# Patient Record
Sex: MALE | Race: WHITE
[De-identification: names, ages, dates, MRNs, and addresses within clinical notes are randomized per-mention and may not be internally consistent; named-entity substitution may affect disease eponyms.]

---

## 2019-06-12 ENCOUNTER — HOSPITAL ENCOUNTER (OUTPATIENT)
Dept: HOSPITAL 84 - D.OPS | Age: 39
Discharge: HOME | End: 2019-06-12
Attending: SURGERY
Payer: COMMERCIAL

## 2019-06-12 VITALS — HEIGHT: 75 IN | WEIGHT: 188 LBS | BODY MASS INDEX: 23.38 KG/M2 | BODY MASS INDEX: 23.38 KG/M2

## 2019-06-12 VITALS — SYSTOLIC BLOOD PRESSURE: 135 MMHG | DIASTOLIC BLOOD PRESSURE: 75 MMHG

## 2019-06-12 DIAGNOSIS — L98.8: Primary | ICD-10-CM

## 2019-06-12 NOTE — NUR
REC'D FROM [facility] ACCOMPANIED BY [facility] OFFICER, DRESSING WITH SMALL AMT OF
BLOODY DRAINAGE TO RIGHT LOWER SIDE. ORANGE JUICE AND FL DIET SERVED.

## 2019-06-12 NOTE — NUR
IV DC'D WITH CATHETER INTACT. WRITTEN AND VERBAL DC INST. GIVEN TO
[facility] OFFICERS ALONG WITH RX. VERBALIZED UNDERSTANDING.

## 2019-06-12 NOTE — OP
PATIENT NAME:  YUDITH ADAMSON                           MEDICAL RECORD: [id number]
:[birth year]                                             LOCATION:[facility]          
                                                         ADMISSION DATE:        
SURGEON:  RICK VAZQUEZ MD            
 
 
DATE OF OPERATION:  2019
 
PREOPERATIVE DIAGNOSIS:  Posttraumatic right chest wall mass.
 
POSTOPERATIVE DIAGNOSES:  Posttraumatic right chest wall mass with small fibrous
tract.
 
PROCEDURE:  Excisional debridement of right chest wall mass.  The dimensions of
debridement, including margins, measured 6.2 in the medial lateral dimension as
well as 4.0 cm in cephalad caudad dimension.  The tissues debrided were the skin
and subcutaneous tissue as well as the fibrous tract.  There was an intermediate
closure.
 
SURGEON:  Rick Vazquez MD
 
ASSISTANT:  None.
 
BLOOD LOSS:  25 cc.
 
ANESTHESIA:  General.
 
COMPLICATIONS:  None.
 
The risks, possible complications and alternatives to the procedure were
explained to the patient.  He elects to proceed.  The discussion specifically
included, but was not limited to, bleeding requiring an emergency reoperation,
infection, possible need for additional procedure or procedures.
 
The patient states that he has had 3 procedures already on this mass.  I have
personally reviewed the CT images prior to the operation.
 
OPERATIVE COURSE:  The patient was conveyed to the operating room electively on
2019.  General anesthesia was induced by the anesthesia staff.  The right
chest was sterilely prepped and draped.  Through the use of double curvilinear
incisions, I excised the skin and subcutaneous tissue overlying the mass.  I
then excised the fibrous tract in the center of this mass.  I approached the
right costal margin.
 
It appears that this mass is actually likely deformity due to fractured bone. 
It is very likely that this deformity will heal with time; however, when the
bones heal, he likely will have the same protuberant deformity for the rest of
his life.  It would be unwise to try to fixate the bone at this site.  It does
not appear to be causing any functional problem.
 
Subcutaneous flaps were created sharply.  The subdermis was approximated with
interrupted 3-0 Vicryls.  The skin was approximated with a running
intracuticular 3-0 Vicryl.  Benzoin and Steri-Strips were applied.
 
The patient was then extubated and conveyed to the post-anesthesia care unit
where he was in stable condition.  He is going to be [facility] back to the
[facility] with [facility]codone for pain.  I think it would be reasonable to renew his
gabapentin as he does have some neurogenic pain.  There is no need for him to
 
 
 
OPERATIVE REPORT                               [id number]    YUDITH ADAMSON         
 
 
follow up with me in the office unless he develops complication related to this
operative procedure.  I could see him on rounds out of [facility] if necessary.
 
TRANSINT:[id number] Voice Confirmation ID: [id number] DOCUMENT ID: [id number]
                                           
                                           RICK VAZQUEZ MD            
 
 
 
Electronically Signed by RICK VAZQUEZ on 19 at 1658
 
 
 
 
 
 
 
 
 
 
 
 
 
 
 
 
 
 
 
 
 
 
 
 
 
 
 
 
 
 
 
 
 
 
 
 
 
CC:                                                             [id number]
DICTATION DATE: 19 1200     :     19 1216      [facility] [place] 
                                                                      [id number] [place] [address]